# Patient Record
Sex: FEMALE | Race: OTHER | NOT HISPANIC OR LATINO | URBAN - METROPOLITAN AREA
[De-identification: names, ages, dates, MRNs, and addresses within clinical notes are randomized per-mention and may not be internally consistent; named-entity substitution may affect disease eponyms.]

---

## 2017-09-26 ENCOUNTER — OUTPATIENT (OUTPATIENT)
Dept: OUTPATIENT SERVICES | Facility: HOSPITAL | Age: 42
LOS: 1 days | Discharge: HOME | End: 2017-09-26

## 2017-09-26 DIAGNOSIS — O42.90 PREMATURE RUPTURE OF MEMBRANES, UNSPECIFIED AS TO LENGTH OF TIME BETWEEN RUPTURE AND ONSET OF LABOR, UNSPECIFIED WEEKS OF GESTATION: ICD-10-CM

## 2017-09-27 DIAGNOSIS — N81.10 CYSTOCELE, UNSPECIFIED: ICD-10-CM

## 2017-09-27 DIAGNOSIS — N92.1 EXCESSIVE AND FREQUENT MENSTRUATION WITH IRREGULAR CYCLE: ICD-10-CM

## 2018-09-25 PROBLEM — Z00.00 ENCOUNTER FOR PREVENTIVE HEALTH EXAMINATION: Status: ACTIVE | Noted: 2018-09-25

## 2018-10-25 ENCOUNTER — APPOINTMENT (OUTPATIENT)
Dept: OBGYN | Facility: CLINIC | Age: 43
End: 2018-10-25

## 2018-11-27 ENCOUNTER — LABORATORY RESULT (OUTPATIENT)
Age: 43
End: 2018-11-27

## 2018-11-27 ENCOUNTER — OUTPATIENT (OUTPATIENT)
Dept: OUTPATIENT SERVICES | Facility: HOSPITAL | Age: 43
LOS: 1 days | Discharge: HOME | End: 2018-11-27

## 2018-11-27 ENCOUNTER — APPOINTMENT (OUTPATIENT)
Dept: OBGYN | Facility: CLINIC | Age: 43
End: 2018-11-27
Payer: COMMERCIAL

## 2018-11-27 VITALS — BODY MASS INDEX: 27.48 KG/M2 | WEIGHT: 140 LBS | HEIGHT: 60 IN

## 2018-11-27 PROCEDURE — 99396 PREV VISIT EST AGE 40-64: CPT

## 2018-11-27 PROCEDURE — 81003 URINALYSIS AUTO W/O SCOPE: CPT | Mod: QW

## 2018-11-29 DIAGNOSIS — Z01.419 ENCOUNTER FOR GYNECOLOGICAL EXAMINATION (GENERAL) (ROUTINE) WITHOUT ABNORMAL FINDINGS: ICD-10-CM

## 2019-12-05 ENCOUNTER — APPOINTMENT (OUTPATIENT)
Dept: OBGYN | Facility: CLINIC | Age: 44
End: 2019-12-05

## 2020-03-13 ENCOUNTER — APPOINTMENT (OUTPATIENT)
Dept: CARDIOLOGY | Facility: CLINIC | Age: 45
End: 2020-03-13

## 2021-05-11 ENCOUNTER — LABORATORY RESULT (OUTPATIENT)
Age: 46
End: 2021-05-11

## 2021-05-13 ENCOUNTER — APPOINTMENT (OUTPATIENT)
Dept: OBGYN | Facility: CLINIC | Age: 46
End: 2021-05-13
Payer: COMMERCIAL

## 2021-05-13 VITALS — TEMPERATURE: 97.9 F | HEIGHT: 60 IN | WEIGHT: 149 LBS | BODY MASS INDEX: 29.25 KG/M2

## 2021-05-13 DIAGNOSIS — M94.0 CHONDROCOSTAL JUNCTION SYNDROME [TIETZE]: ICD-10-CM

## 2021-05-13 PROCEDURE — 99072 ADDL SUPL MATRL&STAF TM PHE: CPT

## 2021-05-13 PROCEDURE — 99396 PREV VISIT EST AGE 40-64: CPT

## 2021-05-13 PROCEDURE — 81003 URINALYSIS AUTO W/O SCOPE: CPT | Mod: QW

## 2022-06-15 ENCOUNTER — LABORATORY RESULT (OUTPATIENT)
Age: 47
End: 2022-06-15

## 2022-06-16 ENCOUNTER — APPOINTMENT (OUTPATIENT)
Dept: OBGYN | Facility: CLINIC | Age: 47
End: 2022-06-16
Payer: COMMERCIAL

## 2022-06-16 VITALS — TEMPERATURE: 98 F | BODY MASS INDEX: 28.47 KG/M2 | WEIGHT: 145 LBS | HEIGHT: 60 IN

## 2022-06-16 DIAGNOSIS — N75.0 CYST OF BARTHOLIN'S GLAND: ICD-10-CM

## 2022-06-16 PROCEDURE — 99396 PREV VISIT EST AGE 40-64: CPT

## 2022-06-17 PROBLEM — N75.0 CYST OF LEFT BARTHOLIN'S GLAND: Status: ACTIVE | Noted: 2021-05-13

## 2022-06-17 LAB
BILIRUB UR QL STRIP: NORMAL
CLARITY UR: CLEAR
GLUCOSE UR-MCNC: NORMAL
HCG UR QL: NORMAL EU/DL
HGB UR QL STRIP.AUTO: 250
KETONES UR-MCNC: NORMAL
LEUKOCYTE ESTERASE UR QL STRIP: NORMAL
NITRITE UR QL STRIP: NORMAL
PH UR STRIP: 5
PROT UR STRIP-MCNC: NORMAL
SP GR UR STRIP: 1.02

## 2023-06-20 ENCOUNTER — NON-APPOINTMENT (OUTPATIENT)
Age: 48
End: 2023-06-20

## 2023-06-26 ENCOUNTER — LABORATORY RESULT (OUTPATIENT)
Age: 48
End: 2023-06-26

## 2023-06-27 ENCOUNTER — APPOINTMENT (OUTPATIENT)
Dept: OBGYN | Facility: CLINIC | Age: 48
End: 2023-06-27
Payer: COMMERCIAL

## 2023-06-27 VITALS — WEIGHT: 150 LBS | HEIGHT: 60 IN | BODY MASS INDEX: 29.45 KG/M2 | TEMPERATURE: 98.9 F

## 2023-06-27 DIAGNOSIS — N39.3 STRESS INCONTINENCE (FEMALE) (MALE): ICD-10-CM

## 2023-06-27 DIAGNOSIS — I10 ESSENTIAL (PRIMARY) HYPERTENSION: ICD-10-CM

## 2023-06-27 DIAGNOSIS — N94.10 UNSPECIFIED DYSPAREUNIA: ICD-10-CM

## 2023-06-27 DIAGNOSIS — Z85.3 PERSONAL HISTORY OF MALIGNANT NEOPLASM OF BREAST: ICD-10-CM

## 2023-06-27 DIAGNOSIS — R31.29 OTHER MICROSCOPIC HEMATURIA: ICD-10-CM

## 2023-06-27 DIAGNOSIS — Z01.419 ENCOUNTER FOR GYNECOLOGICAL EXAMINATION (GENERAL) (ROUTINE) W/OUT ABNORMAL FINDINGS: ICD-10-CM

## 2023-06-27 DIAGNOSIS — R35.0 FREQUENCY OF MICTURITION: ICD-10-CM

## 2023-06-27 DIAGNOSIS — M85.80 OTHER SPECIFIED DISORDERS OF BONE DENSITY AND STRUCTURE, UNSPECIFIED SITE: ICD-10-CM

## 2023-06-27 DIAGNOSIS — Z78.0 ASYMPTOMATIC MENOPAUSAL STATE: ICD-10-CM

## 2023-06-27 DIAGNOSIS — N39.490 STRESS INCONTINENCE (FEMALE) (MALE): ICD-10-CM

## 2023-06-27 DIAGNOSIS — N39.0 URINARY TRACT INFECTION, SITE NOT SPECIFIED: ICD-10-CM

## 2023-06-27 DIAGNOSIS — N81.10 CYSTOCELE, UNSPECIFIED: ICD-10-CM

## 2023-06-27 PROCEDURE — 99396 PREV VISIT EST AGE 40-64: CPT

## 2023-06-30 PROBLEM — N39.3 OVERFLOW STRESS INCONTINENCE OF URINE IN FEMALE: Status: ACTIVE | Noted: 2018-12-09

## 2023-06-30 PROBLEM — N94.10 DYSPAREUNIA IN FEMALE: Status: ACTIVE | Noted: 2023-06-30

## 2023-06-30 PROBLEM — I10 BENIGN ESSENTIAL HYPERTENSION: Status: ACTIVE | Noted: 2021-05-13

## 2023-06-30 PROBLEM — Z85.3 PERSONAL HISTORY OF BREAST CANCER: Status: ACTIVE | Noted: 2023-06-30

## 2023-06-30 PROBLEM — R31.29 MICROHEMATURIA: Status: ACTIVE | Noted: 2023-06-30

## 2023-06-30 PROBLEM — Z78.0 MENOPAUSE: Status: ACTIVE | Noted: 2023-06-30

## 2023-06-30 PROBLEM — N81.10 FEMALE CYSTOCELE: Status: ACTIVE | Noted: 2022-06-17

## 2023-06-30 PROBLEM — Z01.419 WELL WOMAN EXAM WITH ROUTINE GYNECOLOGICAL EXAM: Status: ACTIVE | Noted: 2018-12-09

## 2023-06-30 PROBLEM — R35.0 URINARY FREQUENCY: Status: ACTIVE | Noted: 2023-06-30

## 2023-06-30 PROBLEM — M85.80 OSTEOPENIA, UNSPECIFIED LOCATION: Status: ACTIVE | Noted: 2023-06-30

## 2023-06-30 PROBLEM — N39.0 URINARY TRACT INFECTION: Status: RESOLVED | Noted: 2023-06-30 | Resolved: 2023-07-30

## 2023-06-30 LAB
BILIRUB UR QL STRIP: NORMAL
GLUCOSE UR-MCNC: NORMAL
HCG UR QL: 0.2 EU/DL
HGB UR QL STRIP.AUTO: NORMAL
KETONES UR-MCNC: NORMAL
LEUKOCYTE ESTERASE UR QL STRIP: NORMAL
NITRITE UR QL STRIP: NORMAL
PH UR STRIP: 6
PROT UR STRIP-MCNC: NORMAL
SP GR UR STRIP: 1.01

## 2024-09-30 ENCOUNTER — LABORATORY RESULT (OUTPATIENT)
Age: 49
End: 2024-09-30

## 2024-10-01 ENCOUNTER — APPOINTMENT (OUTPATIENT)
Dept: OBGYN | Facility: CLINIC | Age: 49
End: 2024-10-01
Payer: COMMERCIAL

## 2024-10-01 VITALS — BODY MASS INDEX: 28.47 KG/M2 | HEIGHT: 60 IN | WEIGHT: 145 LBS | TEMPERATURE: 97.3 F

## 2024-10-01 DIAGNOSIS — N39.3 STRESS INCONTINENCE (FEMALE) (MALE): ICD-10-CM

## 2024-10-01 DIAGNOSIS — I10 ESSENTIAL (PRIMARY) HYPERTENSION: ICD-10-CM

## 2024-10-01 DIAGNOSIS — N39.490 STRESS INCONTINENCE (FEMALE) (MALE): ICD-10-CM

## 2024-10-01 DIAGNOSIS — Z01.419 ENCOUNTER FOR GYNECOLOGICAL EXAMINATION (GENERAL) (ROUTINE) W/OUT ABNORMAL FINDINGS: ICD-10-CM

## 2024-10-01 DIAGNOSIS — N81.10 CYSTOCELE, UNSPECIFIED: ICD-10-CM

## 2024-10-01 LAB
APPEARANCE: CLEAR
BILIRUBIN URINE: NEGATIVE
BLOOD URINE: ABNORMAL
COLOR: YELLOW
GLUCOSE QUALITATIVE U: NEGATIVE
KETONES URINE: ABNORMAL
LEUKOCYTE ESTERASE URINE: NEGATIVE
NITRITE URINE: NEGATIVE
PH URINE: 5.5
PROTEIN URINE: NEGATIVE
SPECIFIC GRAVITY URINE: 1.02
UROBILINOGEN URINE: 0.2 (ref 0.2–?)

## 2024-10-01 PROCEDURE — 99396 PREV VISIT EST AGE 40-64: CPT

## 2024-10-06 NOTE — DISCUSSION/SUMMARY
[FreeTextEntry1] : 49 YEAR OLD FEMALE LMP 9/22/2024 WITH CYCLES MONTHLY BUT NOW Q 23-26 X 4, 1ST DAY HEAVIER BUT NOT PAINFUL, PRESENTS FOR WELL WOMAN ANNUAL GYNECOLOGIC EXAMINATINO AND PAP.  LAST SEEN FOR WELL WOMAN VISIT 6/27/2023.  NO NEW MEDICAL OR SURGICAL HISTORY SINCE LAST VISIT. MEDICATIONS; CRESTOR; TOPRIL 50 MG MEDICATION ALLERGIES; PENICILLIN AS A BABY - ? REACTION ROS;BMS-NORMAL; NO FAM HISTORY OF COLON CANCER; ; LAST COLON OSCOPY AT AGE 46          + OCCASIONAL OVERFLOW STRESS URINARY INCONTINENCE          SEXUALLY ACTIVE WITHOUT ANY COMPLAINTS- CONDOMS BREASTS; DOES BREAST SELF EXAMINATION OCCASIONALLY                    MAMMOGRAHY DONE 6/19/2024 BIRADS I                    NO FAM HISTORY OF BREAST CANCER +EXERCISE;+ MULTIVITAMINS; + DAIRY; NO TOBACCO OR VAPING  PE;/72; TEMP 97.3; WEIGHT 145 LBS HEIGHT 5'0"        BREASTS; NO MASSES OR DISCHARGES        ABDOMEN;SOFT, NO MASSES NO TENDERNESS TO PALPATION        PELVIC; NORMAL EXTERNAL GENITALIA                       NORMAL URETHRAL MEATUS                       NORMAL VAGINA WITHOUT LESION                       1ST DEGREE CYSTOCELE                       NORMAL CERVIX                       ANTEVERTED NORMAL UTERUS ON BIMANUAL EXAMINATION                       NO PALPABLE ADNEXAL MASSES  IMP; WELL WOMAN          SLIGHT OVERFLOW STRESS URINARY INCONTINENCE          CYSTOCELE          HYPERTENSION  PLAN; THIN PREP  PAP WITH HR HPV TESTING DONE-PT TO CALL IN 2 WKS FOR RESULTS             BREAST SELF EXAMINATION MONTHLY CONTINUED TO BE STRONGLY ADVISED             ANNUAL MAMMOGRAPHY ADVISED AND REFERRAL SCRIPT GIVEN FOR 6/2025             RETURN TO OFFICE ONE YEAR OR PRN